# Patient Record
Sex: FEMALE | Race: WHITE
[De-identification: names, ages, dates, MRNs, and addresses within clinical notes are randomized per-mention and may not be internally consistent; named-entity substitution may affect disease eponyms.]

---

## 2017-02-23 ENCOUNTER — HOSPITAL ENCOUNTER (OUTPATIENT)
Dept: HOSPITAL 58 - LAB | Age: 62
Discharge: HOME | End: 2017-02-23
Attending: INTERNAL MEDICINE

## 2017-02-23 VITALS — BODY MASS INDEX: 32.1 KG/M2

## 2017-02-23 DIAGNOSIS — N18.3: Primary | ICD-10-CM

## 2017-02-23 LAB
ANION GAP SERPL CALC-SCNC: 14.7 MMOL/L
BUN SERPL-MCNC: 17 MG/DL (ref 7–18)
BUN/CREAT SERPL: 10.89
CALCIUM SERPL-MCNC: 10 MG/DL (ref 8.2–10.2)
CHLORIDE SERPL-SCNC: 104 MMOL/L (ref 98–107)
CO2 BLD-SCNC: 28 MMOL/L (ref 21–32)
CREAT SERPL-MCNC: 1.56 MG/DL (ref 0.6–1.3)
GFR SERPLBLD BASED ON 1.73 SQ M-ARVRAT: 34 ML/MIN
GLUCOSE SERPL-MCNC: 100 MG/DL (ref 70–110)
HCT VFR BLD AUTO: 43.7 % (ref 37–47)
HGB BLD-MCNC: 14.4 G/DL (ref 12–16)
MCH RBC QN: 31.7 PG (ref 27–31)
MCHC RBC AUTO-ENTMCNC: 33 G/DL (ref 31.8–35.4)
MCV RBC: 96.3 FL (ref 81–99)
PLATELET # BLD AUTO: 200 10^3/UL (ref 140–440)
POTASSIUM SERPL-SCNC: 3.7 MMOL/L (ref 3.5–5.1)
RBC # BLD AUTO: 4.54 10^6/UL (ref 4.2–5.4)
SODIUM SERPL-SCNC: 143 MMOL/L (ref 136–145)
WBC # BLD AUTO: 6.7 K/UL (ref 4.6–10.2)

## 2017-02-23 PROCEDURE — 82043 UR ALBUMIN QUANTITATIVE: CPT

## 2017-02-23 PROCEDURE — 36415 COLL VENOUS BLD VENIPUNCTURE: CPT

## 2017-02-23 PROCEDURE — 80048 BASIC METABOLIC PNL TOTAL CA: CPT

## 2017-02-23 PROCEDURE — 85027 COMPLETE CBC AUTOMATED: CPT

## 2017-02-25 LAB
CREAT UR-MCNC: 69.1 MG/DL
URINE MALB/CR RATIO: 9 MG/G CREAT (ref 0–30)

## 2017-09-20 ENCOUNTER — HOSPITAL ENCOUNTER (OUTPATIENT)
Dept: HOSPITAL 58 - LAB | Age: 62
Discharge: HOME | End: 2017-09-20
Attending: INTERNAL MEDICINE

## 2017-09-20 VITALS — BODY MASS INDEX: 32.1 KG/M2

## 2017-09-20 DIAGNOSIS — N18.3: Primary | ICD-10-CM

## 2017-09-20 LAB
ANION GAP SERPL CALC-SCNC: 12.8 MMOL/L
BUN SERPL-MCNC: 18 MG/DL (ref 7–18)
BUN/CREAT SERPL: 12.24
CALCIUM SERPL-MCNC: 10.2 MG/DL (ref 8.2–10.2)
CHLORIDE SERPL-SCNC: 105 MMOL/L (ref 98–107)
CO2 BLD-SCNC: 27 MMOL/L (ref 23–31)
CREAT SERPL-MCNC: 1.47 MG/DL (ref 0.6–1.3)
GFR SERPLBLD BASED ON 1.73 SQ M-ARVRAT: 36 ML/MIN
GLUCOSE SERPL-MCNC: 79 MG/DL (ref 82–115)
HCT VFR BLD AUTO: 41.4 % (ref 37–47)
HGB BLD-MCNC: 14 G/DL (ref 12–16)
MCH RBC QN: 31.8 PG (ref 27–31)
MCHC RBC AUTO-ENTMCNC: 33.8 G/DL (ref 31.8–35.4)
MCV RBC: 94.1 FL (ref 81–99)
PLATELET # BLD AUTO: 180 10^3/UL (ref 140–440)
POTASSIUM SERPL-SCNC: 3.8 MMOL/L (ref 3.5–5.1)
RBC # BLD AUTO: 4.4 10^6/UL (ref 4.2–5.4)
SODIUM SERPL-SCNC: 141 MMOL/L (ref 136–145)
WBC # BLD AUTO: 4.52 K/UL (ref 4.6–10.2)

## 2017-09-20 PROCEDURE — 36415 COLL VENOUS BLD VENIPUNCTURE: CPT

## 2017-09-20 PROCEDURE — 85027 COMPLETE CBC AUTOMATED: CPT

## 2017-09-20 PROCEDURE — 80048 BASIC METABOLIC PNL TOTAL CA: CPT

## 2017-09-20 PROCEDURE — 84156 ASSAY OF PROTEIN URINE: CPT

## 2017-09-20 PROCEDURE — 82570 ASSAY OF URINE CREATININE: CPT

## 2017-09-20 PROCEDURE — 82043 UR ALBUMIN QUANTITATIVE: CPT

## 2017-09-21 LAB — CREAT UR-MCNC: 114.9 MG/DL

## 2017-12-07 ENCOUNTER — HOSPITAL ENCOUNTER (OUTPATIENT)
Dept: HOSPITAL 58 - LAB | Age: 62
Discharge: HOME | End: 2017-12-07
Attending: NURSE PRACTITIONER

## 2017-12-07 VITALS — BODY MASS INDEX: 32.1 KG/M2

## 2017-12-07 DIAGNOSIS — I10: ICD-10-CM

## 2017-12-07 DIAGNOSIS — E78.5: Primary | ICD-10-CM

## 2017-12-07 DIAGNOSIS — N18.9: ICD-10-CM

## 2017-12-07 LAB
ALBUMIN SERPL-MCNC: 4.1 G/DL (ref 3.4–5)
ALBUMIN/GLOB SERPL: 1.08 {RATIO}
ALP SERPL-CCNC: 86 U/L (ref 53–141)
ALT SERPL-CCNC: 21 U/L (ref 12–78)
ANION GAP SERPL CALC-SCNC: 14.9 MMOL/L
AST SERPL-CCNC: 21 U/L (ref 15–37)
BASOPHILS # BLD AUTO: 0.1 K/UL (ref 0–0.2)
BASOPHILS NFR BLD AUTO: 1.2 % (ref 0–3)
BILIRUB SERPL-MCNC: 0.45 MG/DL (ref 0–1.2)
BUN SERPL-MCNC: 16 MG/DL (ref 7–18)
BUN/CREAT SERPL: 10.19
CALCIUM SERPL-MCNC: 10.6 MG/DL (ref 8.2–10.2)
CHLORIDE SERPL-SCNC: 106 MMOL/L (ref 98–107)
CHOLEST SERPL-MCNC: 267 MG/DL (ref 0–200)
CHOLEST/HDLC SERPL: 7 {RATIO} (ref 4.5–5.5)
CO2 BLD-SCNC: 26 MMOL/L (ref 23–31)
CREAT SERPL-MCNC: 1.57 MG/DL (ref 0.6–1.3)
EOSINOPHIL # BLD AUTO: 0.2 K/UL (ref 0–0.7)
EOSINOPHIL NFR BLD AUTO: 4.3 % (ref 0–7)
GFR SERPLBLD BASED ON 1.73 SQ M-ARVRAT: 34 ML/MIN
GLOBULIN SER CALC-MCNC: 3.8 G/L
GLUCOSE SERPL-MCNC: 110 MG/DL (ref 82–115)
HCT VFR BLD AUTO: 41.4 % (ref 37–47)
HDLC SERPL-MCNC: 38 MG/DL (ref 35–80)
HGB BLD-MCNC: 13.8 G/DL (ref 12–16)
IMM GRANULOCYTES NFR BLD AUTO: 0.2 % (ref 0–5)
LYMPHOCYTES # SPEC AUTO: 1.3 K/UL (ref 0.6–3.4)
LYMPHOCYTES NFR BLD AUTO: 26.4 % (ref 10–50)
MCH RBC QN: 31.9 PG (ref 27–31)
MCHC RBC AUTO-ENTMCNC: 33.3 G/DL (ref 31.8–35.4)
MCV RBC: 95.6 FL (ref 81–99)
MONOCYTES # BLD AUTO: 0.5 K/UL (ref 0.4–2)
MONOCYTES NFR BLD AUTO: 10.2 % (ref 0–10)
NEUTROPHILS # BLD AUTO: 2.8 K/UL (ref 2–6.9)
NEUTROPHILS NFR BLD AUTO: 57.7 %
PLATELET # BLD AUTO: 222 10^3/UL (ref 140–440)
POTASSIUM SERPL-SCNC: 3.9 MMOL/L (ref 3.5–5.1)
PROT SERPL-MCNC: 7.9 G/DL (ref 5.8–8.1)
RBC # BLD AUTO: 4.33 10^6/UL (ref 4.2–5.4)
SODIUM SERPL-SCNC: 143 MMOL/L (ref 136–145)
TRIGL SERPL-MCNC: 471 MG/DL (ref 30–150)
WBC # BLD AUTO: 4.92 K/UL (ref 4.6–10.2)

## 2017-12-07 PROCEDURE — 84443 ASSAY THYROID STIM HORMONE: CPT

## 2017-12-07 PROCEDURE — 85025 COMPLETE CBC W/AUTO DIFF WBC: CPT

## 2017-12-07 PROCEDURE — 80061 LIPID PANEL: CPT

## 2017-12-07 PROCEDURE — 80053 COMPREHEN METABOLIC PANEL: CPT

## 2017-12-07 PROCEDURE — 36415 COLL VENOUS BLD VENIPUNCTURE: CPT

## 2018-03-13 ENCOUNTER — HOSPITAL ENCOUNTER (OUTPATIENT)
Dept: HOSPITAL 58 - LAB | Age: 63
Discharge: HOME | End: 2018-03-13
Attending: INTERNAL MEDICINE

## 2018-03-13 VITALS — BODY MASS INDEX: 32.1 KG/M2

## 2018-03-13 DIAGNOSIS — N18.3: Primary | ICD-10-CM

## 2018-03-13 PROCEDURE — 85027 COMPLETE CBC AUTOMATED: CPT

## 2018-03-13 PROCEDURE — 36415 COLL VENOUS BLD VENIPUNCTURE: CPT

## 2018-03-13 PROCEDURE — 80053 COMPREHEN METABOLIC PANEL: CPT

## 2018-03-13 PROCEDURE — 82043 UR ALBUMIN QUANTITATIVE: CPT

## 2018-06-06 ENCOUNTER — HOSPITAL ENCOUNTER (OUTPATIENT)
Dept: HOSPITAL 58 - SCU | Age: 63
Setting detail: OBSERVATION
LOS: 3 days | Discharge: HOME | End: 2018-06-09
Attending: INTERNAL MEDICINE | Admitting: INTERNAL MEDICINE

## 2018-06-06 VITALS — BODY MASS INDEX: 33.9 KG/M2

## 2018-06-06 DIAGNOSIS — R10.9: Primary | ICD-10-CM

## 2018-06-06 DIAGNOSIS — R50.9: ICD-10-CM

## 2018-06-06 DIAGNOSIS — G89.4: ICD-10-CM

## 2018-06-06 DIAGNOSIS — E87.6: ICD-10-CM

## 2018-06-06 DIAGNOSIS — I10: ICD-10-CM

## 2018-06-06 DIAGNOSIS — R11.2: ICD-10-CM

## 2018-06-06 DIAGNOSIS — E78.5: ICD-10-CM

## 2018-06-06 DIAGNOSIS — K29.00: ICD-10-CM

## 2018-06-06 PROCEDURE — 81001 URINALYSIS AUTO W/SCOPE: CPT

## 2018-06-06 PROCEDURE — 82550 ASSAY OF CK (CPK): CPT

## 2018-06-06 PROCEDURE — 99223 1ST HOSP IP/OBS HIGH 75: CPT

## 2018-06-06 PROCEDURE — 87086 URINE CULTURE/COLONY COUNT: CPT

## 2018-06-06 PROCEDURE — 93005 ELECTROCARDIOGRAM TRACING: CPT

## 2018-06-06 PROCEDURE — 96375 TX/PRO/DX INJ NEW DRUG ADDON: CPT

## 2018-06-06 PROCEDURE — 80053 COMPREHEN METABOLIC PANEL: CPT

## 2018-06-06 PROCEDURE — 96372 THER/PROPH/DIAG INJ SC/IM: CPT

## 2018-06-06 PROCEDURE — 93010 ELECTROCARDIOGRAM REPORT: CPT

## 2018-06-06 PROCEDURE — 87186 SC STD MICRODIL/AGAR DIL: CPT

## 2018-06-06 PROCEDURE — 36415 COLL VENOUS BLD VENIPUNCTURE: CPT

## 2018-06-06 PROCEDURE — 82553 CREATINE MB FRACTION: CPT

## 2018-06-06 PROCEDURE — 84484 ASSAY OF TROPONIN QUANT: CPT

## 2018-06-06 PROCEDURE — 99239 HOSP IP/OBS DSCHRG MGMT >30: CPT

## 2018-06-06 PROCEDURE — 96366 THER/PROPH/DIAG IV INF ADDON: CPT

## 2018-06-06 PROCEDURE — 96365 THER/PROPH/DIAG IV INF INIT: CPT

## 2018-06-06 PROCEDURE — 83690 ASSAY OF LIPASE: CPT

## 2018-06-06 PROCEDURE — 96361 HYDRATE IV INFUSION ADD-ON: CPT

## 2018-06-06 PROCEDURE — 99233 SBSQ HOSP IP/OBS HIGH 50: CPT

## 2018-06-06 PROCEDURE — 85025 COMPLETE CBC W/AUTO DIFF WBC: CPT

## 2018-06-06 PROCEDURE — 82150 ASSAY OF AMYLASE: CPT

## 2018-06-06 RX ADMIN — GABAPENTIN SCH MG: 100 CAPSULE ORAL at 14:37

## 2018-06-06 RX ADMIN — Medication SCH SYR: at 14:36

## 2018-06-06 RX ADMIN — GABAPENTIN SCH MG: 100 CAPSULE ORAL at 21:17

## 2018-06-06 RX ADMIN — Medication SCH SYR: at 21:18

## 2018-06-06 RX ADMIN — PANTOPRAZOLE SODIUM SCH MG: 40 INJECTION, POWDER, FOR SOLUTION INTRAVENOUS at 14:39

## 2018-06-06 RX ADMIN — SODIUM CHLORIDE AND POTASSIUM CHLORIDE SCH MLS/HR: .9; .15 SOLUTION INTRAVENOUS at 14:35

## 2018-06-06 NOTE — DI
EXAM:  Chest one view 

  

HISTORY:  Breast cysts 

  

COMPARISON:  02/25/2016 

  

TECHNIQUE:  Single view of the chest was performed 

  

FINDINGS:  The lungs are clear.  There is no pleural effusion or pneumothorax.  The heart is normal i
n size.  The mediastinal contour is normal.  There are no acute abnormalities of the bones. 

  

IMPRESSION:  No acute cardiopulmonary process.

## 2018-06-06 NOTE — CT
EXAM:  CT BRAIN 

  

HISTORY:  Dizziness, elevated blood pressure 

  

TECHNIQUE:  CT brain without intravenous contrast.  5-mm axial sections with Reformations. 

COMPARISON:  None 

  

FINDINGS: 

  

Brain  is unremarkable without evidence of hemorrhage or large vessel distribution recent ischemic in
farction.  There is no suggestion of acute hydrocephalus or subdural fluid collection.  No mass or ma
ss effect. 

  

Cranium is within normal limits.  Mastoid processes are aerated.  The visualized paranasal sinuses  a
re clear. 

  

IMPRESSION:  No acute intracranial process.

## 2018-06-06 NOTE — CT
EXAM:  CT Abdomen without contrast.  CT Pelvis without contrast. 

  

HISTORY:  Generalized abdominal pain. 

  

COMPARISON:  None available. 

  

TECHNIQUE:  Multiple axial images of the abdomen and pelvis were obtained without intravenous contras
t.  Images were reformatted in the sagittal and coronal plane. 

  

  

FINDINGS:  Please note that evaluation of the abdominal and pelvic structures is limited due to lack 
of intravenous contrast. 

  

No acute abnormality identified in the lung bases..  Degenerative changes present in the spine. 

  

The liver, gallbladder, pancreas, spleen, adrenal glands, and kidneys demonstrate normal contour.  He
patic cysts are stable from prior chest CT. 

  

Small hiatal hernia noted.  There is no evidence for bowel obstruction.  The appendix is normal.  Shea
willa demonstrates normal contour.  Urinary bladder is unremarkable.  Phleboliths present in the pelvis
.  No free fluid or free air identified. 

  

---------------------------- 

IMPRESSION: 

  

No acute abnormality in the abdomen or pelvis.

## 2018-06-07 RX ADMIN — PANTOPRAZOLE SODIUM SCH MG: 40 INJECTION, POWDER, FOR SOLUTION INTRAVENOUS at 15:01

## 2018-06-07 RX ADMIN — GABAPENTIN SCH MG: 100 CAPSULE ORAL at 08:45

## 2018-06-07 RX ADMIN — SODIUM CHLORIDE AND POTASSIUM CHLORIDE SCH: .9; .15 SOLUTION INTRAVENOUS at 06:30

## 2018-06-07 RX ADMIN — SODIUM CHLORIDE AND POTASSIUM CHLORIDE SCH MLS/HR: .9; .15 SOLUTION INTRAVENOUS at 03:19

## 2018-06-07 RX ADMIN — GABAPENTIN SCH MG: 100 CAPSULE ORAL at 22:01

## 2018-06-07 RX ADMIN — ASPIRIN SCH MG: 81 TABLET, COATED ORAL at 08:45

## 2018-06-07 RX ADMIN — SODIUM CHLORIDE AND POTASSIUM CHLORIDE SCH MLS/HR: .9; .15 SOLUTION INTRAVENOUS at 17:12

## 2018-06-07 RX ADMIN — GABAPENTIN SCH MG: 100 CAPSULE ORAL at 15:00

## 2018-06-08 RX ADMIN — Medication SCH SYR: at 20:13

## 2018-06-08 RX ADMIN — PANTOPRAZOLE SODIUM SCH MG: 40 INJECTION, POWDER, FOR SOLUTION INTRAVENOUS at 09:20

## 2018-06-08 RX ADMIN — GABAPENTIN SCH MG: 100 CAPSULE ORAL at 20:13

## 2018-06-08 RX ADMIN — GABAPENTIN SCH MG: 100 CAPSULE ORAL at 15:26

## 2018-06-08 RX ADMIN — NITROFURANTOIN MONOHYDRATE AND NITROFURANTOIN MACROCRYSTALLINE SCH MG: 75; 25 CAPSULE ORAL at 09:21

## 2018-06-08 RX ADMIN — SODIUM CHLORIDE AND POTASSIUM CHLORIDE SCH MLS/HR: .9; .15 SOLUTION INTRAVENOUS at 05:46

## 2018-06-08 RX ADMIN — NITROFURANTOIN MONOHYDRATE AND NITROFURANTOIN MACROCRYSTALLINE SCH MG: 75; 25 CAPSULE ORAL at 20:13

## 2018-06-08 RX ADMIN — GABAPENTIN SCH MG: 100 CAPSULE ORAL at 09:20

## 2018-06-08 RX ADMIN — SODIUM CHLORIDE AND POTASSIUM CHLORIDE SCH: .9; .15 SOLUTION INTRAVENOUS at 15:33

## 2018-06-08 RX ADMIN — ASPIRIN SCH MG: 81 TABLET, COATED ORAL at 09:20

## 2018-06-09 VITALS — DIASTOLIC BLOOD PRESSURE: 92 MMHG | SYSTOLIC BLOOD PRESSURE: 168 MMHG

## 2018-06-09 VITALS — TEMPERATURE: 98.1 F

## 2018-06-09 RX ADMIN — GABAPENTIN SCH MG: 100 CAPSULE ORAL at 09:04

## 2018-06-09 RX ADMIN — ASPIRIN SCH MG: 81 TABLET, COATED ORAL at 09:04

## 2018-06-09 RX ADMIN — PANTOPRAZOLE SODIUM SCH MG: 40 INJECTION, POWDER, FOR SOLUTION INTRAVENOUS at 09:08

## 2018-06-09 RX ADMIN — NITROFURANTOIN MONOHYDRATE AND NITROFURANTOIN MACROCRYSTALLINE SCH MG: 75; 25 CAPSULE ORAL at 09:04

## 2018-06-09 RX ADMIN — Medication SCH SYR: at 04:06

## 2018-06-13 NOTE — DS
DATE OF SERVICE:  06/09/18



FINAL DIAGNOSIS: 

1. Acute gastritis 

2. Severe hypokalemia

3. Uncontrolled hypertension 

4. Elevated CK

5. Fibromyalgia 

6. Chronic pain syndrome 

7. Osteoarthritis 

8. DJD spine 



DISCHARGE INSTRUCTIONS:

Discharge the patient home. Increase hydration. 



MEDICATIONS AT DISCHARGE:

Albuterol 

Flexeril 

Zantac 

Imitrex 

Aspirin 

Calcium 

Benadryl 

Fish oil 

Neurontin 

Maalox 

Multiple Vitamin 

Tizanidine 

Zestril 



NEW PRESCRIPTIONS:

Lisinopril 10mg Po daily 



DIET INSTRUCTIONS: 

Cardiac and healthy and soft diet at this time 



ACTIVITY:

As much as tolerated



DISEASE SPECIFIC EDUCATION:

Dehydration 

Gastritis 

Hypokalemia and heart problems been discussed



HOSPITAL COURSE:

Akua Blackmon 62 year old female with multiple medical problem came to the office 
complaining of severe abdominal pain, nausea and vomiting and not able to keep 
anything down. Admitted to the hospital with Potassium 2.8 which was started on 
IV fluids and IV replacement. Blood pressure been running high. The patient did 
says that patient does not tolerate any kind of a blood pressure medication so 
the patient was given a Clonidine 0.2mg PRN. The patient was allergic to 
Atenolol, Coreg, Omeprazole, Oxycodone and Tylenol. CT of the abdomen and 
pelvis did not show any acute findings. CT head is negative. X-ray of the chest 
showed no acute cardiopulmonary process. With Protonix and Carafate the patient 
started feeling better with the abdomen and then started on a clear liquid diet 
which she was able to tolerate. Demerol was given for the pain. Potassium 2.8, 
3.4, 3.4 to 4.0. Up and about tolerating the diet. At that time the patient 
being discharged home. 



TIME SPENT: MORE THAN 65 MINUTES
[facility]

## 2018-07-20 ENCOUNTER — HOSPITAL ENCOUNTER (OUTPATIENT)
Dept: HOSPITAL 58 - RAD | Age: 63
Discharge: HOME | End: 2018-07-20
Attending: INTERNAL MEDICINE

## 2018-07-20 VITALS — BODY MASS INDEX: 33.9 KG/M2

## 2018-07-20 DIAGNOSIS — R31.9: ICD-10-CM

## 2018-07-20 DIAGNOSIS — N18.3: Primary | ICD-10-CM

## 2018-07-20 DIAGNOSIS — I12.9: ICD-10-CM

## 2018-07-20 NOTE — US
EXAM:  Ultrasound renal Doppler. 

  

HISTORY:  Chronic kidney disease stage III.  Hypertension. 

  

COMPARISON:  CT 06/06/2018. 

  

TECHNIQUE:  Gray-scale and color Doppler images. 

  

FINDINGS:  Right kidney measures 8.6 cm in length.  There is no hydronephrosis. 

  

Peak systolic velocity measurement in the right renal artery are 1.1, 1.0, 1.3 meters per second in t
he origin, midportion and distal portion respectively.  Right renal artery to aortic ratios measure 1
.3, 1.2 and 1.6.  Right renal resistive index measures 0.44. 

  

Left kidney measures 9 cm in length.  There is no hydronephrosis. 

  

Peak systolic velocity measurements in the left renal artery are 0.5, 0.6 and 0.7 meters per second i
n the origin, midportion and distal portion respectively.  Left renal artery to aortic ratios measure
 0.6, 0.7 and 0.8.  Left renal resistive index measures 0.59. 

  

Venous outflow is seen bilaterally. 

  

------------------------------ 

IMPRESSION: 

No evidence for hemodynamically significant stenosis in the right or left renal artery.

## 2018-07-20 NOTE — US
EXAM:  Ultrasound retroperitoneal complete. 

  

HISTORY:  Chronic kidney disease stage III.  Hypertension. 

  

COMPARISON:  CT 06/06/2018. 

  

TECHNIQUE:  Multiple gray scale and color Doppler images. 

  

FINDINGS:  Visualization of the kidneys is limited by technical factors.  Right kidney measures 8.6 x
 3.9 x 3.7 cm.  The left kidney measures 9 x 4.5 x 4.3 cm.  Cortical echogenicity is grossly normal. 
 There is no hydronephrosis. 

  

Urinary bladder is unremarkable. 

  

---------------------------- 

IMPRESSION: 

  

No acute sonographic abnormality of the kidneys or bladder.

## 2018-07-26 NOTE — PN
DATE OF SERVICE:  06/07/18



SUBJECTIVE:  

The patient was admitted from the office for the severe abdominal pain and 
uncontrolled hypertension. Potassium was 2.3 on admission, being replaced. Less 
abdominal pain, no nausea and no diarrhea. She is hungry and wants to eat food, 
a lot of gas. 



REVIEW OF SYSTEMS:  

CONSTITUTIONAL: No fever, no chills.  

HEENT:  Normal.

ENDOCRINE: No weight gain, no weight loss.

CVS:  No angina symptoms. No CHF symptoms.  No palpitations.  No atypical chest 
pain for CAD.  No shortness of breath. No PND, no orthopnea. 

RESPIRATORY:  No cough, no hemoptysis. 

GI:  No nausea, no vomiting.  No abdominal pain. 

:  No hematuria. No polyuria. 

MUSCULOSKELETAL:  No joint swelling. 

PSYCHIATRIC: Not anxious. No depression. No suicidal thoughts. No homicidal 
thoughts. 

SKIN: Intact. No rash. 



PHYSICAL EXAMINATION:  

V/S:  Blood pressure 177/78, respiratory rate 20, heart rate 78, temperature 
97.9 with saturation 97%.

HEENT: Normocephalic, atraumatic. Mucosa dry. Pallor positive. 

NECK:  Supple.  No JVD, no carotid bruit. No lymphadenopathy.

LUNGS:  Clear to auscultation. No rales or rhonchi. 

HEART:  S1, S2 normal. No S3. No murmur, gallop or regurgitation. 

ABDOMEN: Soft, Discomfort all over. Bowel sounds active. No rigidity. No 
rebound or guarding. No CVA tenderness. 

EXTREMITIES:   No cyanosis, clubbing or pedal edema.

MUSCULOSKELETAL:  No joint swelling. 

NEUROLOGIC:  Awake, alert.   No focal deficit. 

LYMPHATIC: No lymph nodes palpable. 

SKIN: Intact.



LABS:

WBC 5.05, hgb 11.7, hct 34.7, plt count 157, sodium 143, potassium 3.4, 
chloride 114, bicarb 20, BUN 15, creatinine 1.07 and glucose 78.



ASSESSMENT: 

1.  Severe hypokalemia 

2.  Abdominal pain 

3.  Acute Gastroenteritis 

4.  Hypertension, uncontrolled 

5.  Spinal stenosis 

6.  Osteoarthritis 



PLAN:

1.  CT of abdomen and pelvis 

2.  CT head 

3.  Chest x-ray 

4.  Clonidine 0.2 times one dose 

5.  Hold Potassium 

6.  Fluids 

7.  Morphine for the pain 

8.  Protonix IV push 



TIME SPENT: More than 35 minutes
MTDD

## 2018-07-26 NOTE — PN
DATE OF SERVICE:  06/08/18



SUBJECTIVE: 

The patient was admitted with the abdominal pain. She feels like she may have 
had an allergies reaction to some medication because ever since she started on 
the blood pressure medication she feels like blood pressure medication does not 
go with her body and she is having a bad reaction. Tried to convince the 
patient that may not be the case but she keeps complaining. 



REVIEW OF SYSTEMS:  

CONSTITUTIONAL: No fever, no chills.  

HEENT:  Normal.

ENDOCRINE: No weight gain, no weight loss.

CVS:  No angina symptoms. No CHF symptoms.  No palpitations.  No atypical chest 
pain for CAD.  No shortness of breath. No PND, no orthopnea. 

RESPIRATORY:  No cough, no hemoptysis. 

GI:  No nausea, no vomiting.  No abdominal pain. 

:  No hematuria. No polyuria. 

MUSCULOSKELETAL:  No joint swelling. 

PSYCHIATRIC: Not anxious. No depression. No suicidal thoughts. No homicidal 
thoughts. 

SKIN: Intact. No rash. 



PHYSICAL EXAMINATION:  

V/S: Blood pressure 152/95, respiratory rate 16, heart rate 75, temperature 
97.7 with saturation 98%.

HEENT: Normocephalic, atraumatic. Mucosa dry. Pallor positive. No icterus. 

NECK:  Supple.  No JVD, no carotid bruit. No lymphadenopathy.

LUNGS:  Clear to auscultation. No rales or rhonchi. 

HEART:  S1, S2 normal. No S3. No murmur, gallop or regurgitation. 

ABDOMEN: Soft, nontender. Bowel sounds active. No rigidity. No rebound or 
guarding. No CVA tenderness. 

EXTREMITIES:   No cyanosis, clubbing or pedal edema.

MUSCULOSKELETAL:  No joint swelling. 

NEUROLOGIC:  Awake, alert.   No focal deficit. 

LYMPHATIC: No lymph nodes palpable. 

SKIN: Intact.



LABS:

WBC 5.60, hgb 12.3, hct 36.1, plt count 154, sodium 140, potassium 4.0, 
chloride 111, bicarb 23, BUN 12, creatinine 1.10 and glucose 88



ASSESSMENT: 

1.  Hypokalemia 

2.  Uncontrolled hypertension 

3.  Acute gastroenteritis 

4.  Severe abdominal pain 

5.  Chronic pain syndrome 



PLAN:

1.  Continue IV fluids

2.  Daily I&O

3.  Morphine for the pain. 



TIME SPENT: More than 35 minutes
MTDD

## 2018-08-14 ENCOUNTER — HOSPITAL ENCOUNTER (OUTPATIENT)
Dept: HOSPITAL 58 - LAB | Age: 63
Discharge: HOME | End: 2018-08-14
Attending: INTERNAL MEDICINE

## 2018-08-14 VITALS — BODY MASS INDEX: 33.9 KG/M2

## 2018-08-14 DIAGNOSIS — N18.3: Primary | ICD-10-CM

## 2018-08-14 DIAGNOSIS — I12.9: ICD-10-CM

## 2018-08-14 DIAGNOSIS — R31.9: ICD-10-CM

## 2018-08-14 PROCEDURE — 86160 COMPLEMENT ANTIGEN: CPT

## 2018-08-14 PROCEDURE — 86325 OTHER IMMUNOELECTROPHORESIS: CPT

## 2018-08-14 PROCEDURE — 86803 HEPATITIS C AB TEST: CPT

## 2018-08-14 PROCEDURE — 85018 HEMOGLOBIN: CPT

## 2018-08-14 PROCEDURE — 80069 RENAL FUNCTION PANEL: CPT

## 2018-08-14 PROCEDURE — 85014 HEMATOCRIT: CPT

## 2018-08-14 PROCEDURE — 83520 IMMUNOASSAY QUANT NOS NONAB: CPT

## 2018-08-14 PROCEDURE — 81001 URINALYSIS AUTO W/SCOPE: CPT

## 2018-08-14 PROCEDURE — 83970 ASSAY OF PARATHORMONE: CPT

## 2018-08-14 PROCEDURE — 83516 IMMUNOASSAY NONANTIBODY: CPT

## 2018-08-14 PROCEDURE — 36415 COLL VENOUS BLD VENIPUNCTURE: CPT

## 2018-08-14 PROCEDURE — 86038 ANTINUCLEAR ANTIBODIES: CPT

## 2018-08-14 PROCEDURE — 87340 HEPATITIS B SURFACE AG IA: CPT

## 2018-08-22 ENCOUNTER — HOSPITAL ENCOUNTER (OUTPATIENT)
Dept: HOSPITAL 58 - LAB | Age: 63
Discharge: HOME | End: 2018-08-22
Attending: INTERNAL MEDICINE

## 2018-08-22 VITALS — BODY MASS INDEX: 33.9 KG/M2

## 2018-08-22 DIAGNOSIS — I12.9: ICD-10-CM

## 2018-08-22 DIAGNOSIS — R31.9: ICD-10-CM

## 2018-08-22 DIAGNOSIS — N18.3: Primary | ICD-10-CM

## 2018-08-22 PROCEDURE — 80048 BASIC METABOLIC PNL TOTAL CA: CPT

## 2018-08-22 PROCEDURE — 36415 COLL VENOUS BLD VENIPUNCTURE: CPT

## 2018-09-19 ENCOUNTER — HOSPITAL ENCOUNTER (OUTPATIENT)
Dept: HOSPITAL 58 - LAB | Age: 63
Discharge: HOME | End: 2018-09-19
Attending: INTERNAL MEDICINE

## 2018-09-19 VITALS — BODY MASS INDEX: 33.9 KG/M2

## 2018-09-19 DIAGNOSIS — I12.9: ICD-10-CM

## 2018-09-19 DIAGNOSIS — N18.3: Primary | ICD-10-CM

## 2018-09-19 PROCEDURE — 80048 BASIC METABOLIC PNL TOTAL CA: CPT

## 2018-09-19 PROCEDURE — 84156 ASSAY OF PROTEIN URINE: CPT

## 2018-09-19 PROCEDURE — 82570 ASSAY OF URINE CREATININE: CPT

## 2018-09-19 PROCEDURE — 36415 COLL VENOUS BLD VENIPUNCTURE: CPT

## 2019-01-24 ENCOUNTER — HOSPITAL ENCOUNTER (OUTPATIENT)
Dept: HOSPITAL 58 - RHC-LAB | Age: 64
Discharge: HOME | End: 2019-01-24
Attending: NURSE PRACTITIONER

## 2019-01-24 VITALS — BODY MASS INDEX: 33.9 KG/M2

## 2019-01-24 DIAGNOSIS — G89.29: ICD-10-CM

## 2019-01-24 DIAGNOSIS — M54.9: Primary | ICD-10-CM

## 2019-01-24 DIAGNOSIS — E78.5: ICD-10-CM

## 2019-01-24 DIAGNOSIS — I10: ICD-10-CM

## 2019-01-24 PROCEDURE — 80053 COMPREHEN METABOLIC PANEL: CPT

## 2019-01-24 PROCEDURE — 36415 COLL VENOUS BLD VENIPUNCTURE: CPT

## 2019-01-24 PROCEDURE — 80061 LIPID PANEL: CPT

## 2019-01-24 PROCEDURE — 85007 BL SMEAR W/DIFF WBC COUNT: CPT

## 2019-01-24 PROCEDURE — 85025 COMPLETE CBC W/AUTO DIFF WBC: CPT

## 2019-01-24 PROCEDURE — 84443 ASSAY THYROID STIM HORMONE: CPT

## 2019-08-28 ENCOUNTER — HOSPITAL ENCOUNTER (OUTPATIENT)
Dept: HOSPITAL 58 - LAB | Age: 64
Discharge: HOME | End: 2019-08-28
Attending: INTERNAL MEDICINE

## 2019-08-28 VITALS — BODY MASS INDEX: 33.9 KG/M2

## 2019-08-28 DIAGNOSIS — I12.9: ICD-10-CM

## 2019-08-28 DIAGNOSIS — N18.3: Primary | ICD-10-CM

## 2019-08-28 PROCEDURE — 82570 ASSAY OF URINE CREATININE: CPT

## 2019-08-28 PROCEDURE — 85018 HEMOGLOBIN: CPT

## 2019-08-28 PROCEDURE — 81001 URINALYSIS AUTO W/SCOPE: CPT

## 2019-08-28 PROCEDURE — 83970 ASSAY OF PARATHORMONE: CPT

## 2019-08-28 PROCEDURE — 80069 RENAL FUNCTION PANEL: CPT

## 2019-08-28 PROCEDURE — 85014 HEMATOCRIT: CPT

## 2019-08-28 PROCEDURE — 84156 ASSAY OF PROTEIN URINE: CPT

## 2019-08-28 PROCEDURE — 36415 COLL VENOUS BLD VENIPUNCTURE: CPT
